# Patient Record
Sex: MALE | Race: WHITE | NOT HISPANIC OR LATINO | Employment: PART TIME | URBAN - METROPOLITAN AREA
[De-identification: names, ages, dates, MRNs, and addresses within clinical notes are randomized per-mention and may not be internally consistent; named-entity substitution may affect disease eponyms.]

---

## 2020-10-13 PROBLEM — B18.2 CHRONIC HEPATITIS C WITHOUT HEPATIC COMA: Status: ACTIVE | Noted: 2020-10-13

## 2020-10-13 PROBLEM — K74.60 CIRRHOSIS OF LIVER WITHOUT ASCITES: Status: ACTIVE | Noted: 2020-10-13

## 2020-10-13 PROBLEM — K76.0 HEPATIC STEATOSIS: Status: ACTIVE | Noted: 2020-10-13

## 2020-10-13 PROBLEM — R03.0 ELEVATED BLOOD PRESSURE READING: Status: ACTIVE | Noted: 2020-10-13

## 2020-11-17 ENCOUNTER — SPECIALTY PHARMACY (OUTPATIENT)
Dept: PHARMACY | Facility: CLINIC | Age: 45
End: 2020-11-17

## 2020-11-17 NOTE — TELEPHONE ENCOUNTER
Specialty Pharmacy - Initial Clinical Assessment    Specialty Medication Orders Linked to Encounter      Most Recent Value   Medication #1  sofosbuvir-velpatasvir 400-100 mg Tab (Order#[id number], Rx#[id number])        Subjective    Rohan Hull is a 45 y.o. male, who is followed by the specialty pharmacy service for management and education.    Recent Encounters     Date Type Provider Description    11/17/2020 Specialty Pharmacy Calista Rivera PharmD Initial Clinical Assessment    11/02/2020 Specialty Pharmacy Shanna Carrasquillo, Vandana Referral Authorization        Clinical call attempts since last clinical assessment   No call attempts found.     Today he received education before his first fill with [facility].    Current Outpatient Medications   Medication Sig    buprenorphine HCL (SUBUTEX) 2 mg Subl Place 4 mg under the tongue 2 (two) times daily.    dextroamphetamine-amphetamine (ADDERALL XR) 20 MG 24 hr capsule Take 20 mg by mouth every morning.    NIFEdipine (ADALAT CC) 60 MG TbSR TK 1 T PO QD OES    sofosbuvir-velpatasvir 400-100 mg Tab Take 1 tablet by mouth once daily.   Last reviewed on 11/17/2020 10:59 AM by Calista Rivera, Vandana    Review of patient's allergies indicates:  No Known AllergiesLast reviewed on  11/17/2020 11:00 AM by Calista Rivera    Drug Interactions    Drug interactions evaluated: yes  Clinically relevant drug interactions identified: no  Provided the patient with educational material regarding drug interactions: yes   Educational material comments: Epclusa and antacids - Patient does NOT report taking antacids. However due to antacids being OTC products reviewed antacids are to be spaced out at least 4 hours apart from Epclusa.            Adverse Effects    *All other systems reviewed and are negative       Assessment Questions - Documented Responses      Most Recent Value   Assessment   Medication Reconciliation completed for patient  Yes   During the past 4  weeks, has patient missed any activities due to condition or medication?  No   During the past 4 weeks, did patient have any of the following urgent care visits?  None   Goals of Therapy Status  Discussed (new start)   Welcome packet contents reviewed and discussed with patient?  Yes   Assesment completed?  Yes   Plan  Therapy being initiated   Do you need to open a clinical intervention (i-vent)?  No   Do you want to schedule first shipment?  Yes   Medication #1 Assessment Info   Patient status  New medication, New to [facility]   Is this medication appropriate for the patient?  Yes   Is this medication effective?  Not yet started        Refill Questions - Documented Responses      Most Recent Value   Relationship to patient of person spoken to?  Self   HIPAA/medical authority confirmed?  Yes   Can the patient store medication/sharps container properly (at the correct temperature, away from children/pets, etc.)?  Yes   Can the patient call emergency services (911) in the event of an emergency?  Yes   Does the patient have any concerns or questions about taking or administering this medication as prescribed?  No   During the past 4 weeks, has patient missed any activities due to condition or medication?  No   During the past 4 weeks, did patient have any of the following urgent care visits?  None   How will the patient receive the medication?  Mail   When does the patient need to receive the medication?  11/20/20   Shipping Address  Home   Address in [facility] confirmed and updated if neccessary?  Yes   Expected Copay ($)  0   Is the patient able to afford the medication copay?  Yes   Payment Method  zero copay   Days supply of Refill  28   Refill activity completed?  Yes   Shipment/Pickup Date:  11/18/20          Objective    He has a past medical history of Cirrhosis of liver, Gout, Hepatic steatosis, Hepatitis C virus, and HTN (hypertension).    Tried/failed medications: treatment naive    BP Readings from Last  "4 Encounters:   10/29/20 133/69   10/13/20 (!) 169/112     Ht Readings from Last 4 Encounters:   10/29/20 6' 4" (1.93 m)   10/13/20 6' 4" (1.93 m)     Wt Readings from Last 4 Encounters:   10/29/20 114.8 kg (253 lb)   10/13/20 114.2 kg (251 lb 12.8 oz)     No results found for: HCVGENOTYPE  Recent Labs   Lab Result Units 10/13/20  1227 10/13/20  1202   Creatinine mg/dL 0.85  --    ALT U/L 129 H  --    AST U/L 89 H  --    Hep B S Ab   --  Negative   Hep B Core Total Ab   --  Negative     The goals of Hepatitis C Virus (HCV) infection treatment include:  · Reducing all-cause mortality and liver-related health adverse consequences, including cirrhosis, end-stage liver disease, and hepatocellular carcinoma  · Achieving virologic cure as evidenced by a sustained virologic response  · Improving or maintaining quality of life  · Maintaining optimal therapy adherence  · Minimizing and managing side effects  · Preventing the transmission of HCV      Goals of Therapy Status: Discussed (new start)    Assessment/Plan  Patient plans to start therapy on 11/20/20      Indication, dosage, appropriateness, effectiveness, safety and convenience of his specialty medication(s) were reviewed today.     Patient Counseling    Counseled the patient on the following: doses and administration discussed, safe handling, storage, and disposal discussed, possible adverse effects and management discussed, possible drug and prescription drug interactions discussed, possible drug and OTC drug and food interactions discussed, lab monitoring and follow-up discussed, use of contraception discussed, therapeutic rationale discussed, cost of medications and cost implications discussed, adherence and missed doses discussed, pharmacy contact information discussed       Epclusa 400/100mg- Take one tablet by mouth daily x 12 weeks  Counseling was reviewed:   1. Patient MUST take Epclusa at the SAME time every day.   2. Patient MUST avoid acid reducers " without consulting with myself or provider first. Antacids are to be spaced out at least 4 hours apart from Epclusa.     3. Potential Side effects include: headaches and fatigue.   Headache: Patient may treat with OTC remedies. If Tylenol is used, dose should not exceed 2000mg per day.    4. Allergies reviewed and medication reconciliation complete (reviewed and documented in [facility] and [facility]). Patient MUST contact myself or provider prior to starting any new OTC, herbal, or prescription drugs to avoid potential DDIs.    DDI: Medication list reviewed and potential DDIs addressed.      Tasks added this encounter   No tasks added.   Tasks due within next 3 months   11/13/2020 - Clinical - Initial Assessment     Calista Rivera, PharmD  [facility]  [address] [place] [id number]  Phone: [phone]  Fax: [phone]

## 2020-11-27 ENCOUNTER — SPECIALTY PHARMACY (OUTPATIENT)
Dept: PHARMACY | Facility: CLINIC | Age: 45
End: 2020-11-27

## 2020-11-27 NOTE — TELEPHONE ENCOUNTER
7 Day Touchbase - Documented Responses      Most Recent Value   Have you started taking your medication?  Yes   What day did you start?  20   How are you feeling?   Patient reports experiencing no side effects since beginning therapy.   How are you taking your medication? Are you having any problems taking your medication?  Patient states he is taking AG Epclusa at 9 AM daily. No missed doses.   Do you have any questions or concerns that we can help you with today?  No questions or concerns at this time.  Aware [facility] will call for refills when patient has 7-days of medication on hand.        Initial touch base conducted for AG Epclusa - Name/ confirmed.  Confirmed patient started medication as instructed on .  Patient confirms that they are taking AG Epclusa every day at 9 AM.  He is not requiring any medication for heart burn. Patient denies skipping or missing doses.  Patient reports experiencing no side effects since beginning therapy. Patient reports no new medications, otc remedies, or allergies. Patient reminded of lab appointments.  Patient counseled on importance of maintaining adherence and keeping lab appointments which were scheduled.  Advised to call [facility] and provider if any issues arise.  No questions or concerns. Aware [facility] will call for refills when patient has 7 days of medication on hand.    Calista Rivera, PharmD  [facility]  [address] [place] [id number]  Phone: [phone]  Fax: [phone]

## 2020-12-11 ENCOUNTER — SPECIALTY PHARMACY (OUTPATIENT)
Dept: PHARMACY | Facility: CLINIC | Age: 45
End: 2020-12-11

## 2020-12-12 NOTE — TELEPHONE ENCOUNTER
Specialty Pharmacy - Refill Coordination    Specialty Medication Orders Linked to Encounter      Most Recent Value   Medication #1  sofosbuvir-velpatasvir 400-100 mg Tab (Order#[id number], Rx#[id number])          Refill Questions - Documented Responses      Most Recent Value   Relationship to patient of person spoken to?  Self   HIPAA/medical authority confirmed?  Yes   Has the patient had any insurance changes?  No   Has the patient had any changes to specialty medication, dose, or instructions?  No   Has the patient started taking any new medications, herbals, or supplements?  No   Has the patient been diagnosed with any new medical conditions?  No   Does the patient have any new allergies to medications or foods?  No   Does the patient have any concerns about side effects?  No   Can the patient store medication/sharps container properly (at the correct temperature, away from children/pets, etc.)?  Yes   Can the patient call emergency services (911) in the event of an emergency?  Yes   Does the patient have any concerns or questions about taking or administering this medication as prescribed?  No   How many doses did the patient miss in the past 4 weeks or since the last fill?  0   How many doses does the patient have on hand?  7   How many days does the patient report on hand quantity will last?  7   Does the number of doses/days supply remaining match pharmacy expected amounts?  Yes   Does the patient feel that this medication is effective?  Yes   During the past 4 weeks, has patient missed any activities due to condition or medication?  No   During the past 4 weeks, did patient have any of the following urgent care visits?  None   How will the patient receive the medication?  Mail   When does the patient need to receive the medication?  12/18/20   Shipping Address  Home   Address in [facility] confirmed and updated if neccessary?  Yes   Expected Copay ($)  0   Is the patient able to afford the medication  copay?  Yes   Payment Method  zero copay   Days supply of Refill  28   Would patient like to speak to a pharmacist?  No   Do you want to trigger an intervention?  No   Do you want to trigger an additional referral task?  No   Refill activity completed?  Yes   Refill activity plan  Refill scheduled   Shipment/Pickup Date:  20          Current Outpatient Medications   Medication Sig    buprenorphine HCL (SUBUTEX) 2 mg Subl Place 4 mg under the tongue 2 (two) times daily.    dextroamphetamine-amphetamine (ADDERALL XR) 20 MG 24 hr capsule Take 20 mg by mouth every morning.    NIFEdipine (ADALAT CC) 60 MG TbSR TK 1 T PO QD OES    sofosbuvir-velpatasvir 400-100 mg Tab Take 1 tablet by mouth once daily.   Last reviewed on 2020 10:59 AM by Calista Rivera PharmD    Review of patient's allergies indicates:  No Known Allergies Last reviewed on  2020 11:00 AM by Calista Rivera    AG Epclusa refill (2 of 3) confirmed and reassessment complete. We will ship AG Epclusa refill on  via fedex to arrive on 12/15. $0 copay- 004. Confirmed 2 patient identifiers - name and . Therapy appropriate.     Patient has 7 doses of AG Epclusa remaining and takes it around 9 AM daily.  Pt reports they are not having any side effects so far. No missed doses, no new medications, no new allergies or health conditions reported at this time. Allergies reviewed and medication reconciliation complete (reviewed and documented in [facility] and [facility]).  Disease education reviewed (including transmission and prevention). Patient counseled on importance of maintaining adherence and keeping lab appointments which were scheduled. All questions answered and addressed to patients satisfaction. Advised to call [facility] and provider if any issues arise.  Pt verbalized understanding.      Tasks added this encounter   No tasks added.   Tasks due within next 3 months   12/10/2020 - Refill Call (Auto Added)     Calista Rivera  PharmD  [facility]  [address] [facility] [address]  Phone: [phone]  Fax: [phone]

## 2021-01-08 ENCOUNTER — SPECIALTY PHARMACY (OUTPATIENT)
Dept: PHARMACY | Facility: CLINIC | Age: 46
End: 2021-01-08